# Patient Record
Sex: FEMALE | Race: WHITE | NOT HISPANIC OR LATINO | URBAN - METROPOLITAN AREA
[De-identification: names, ages, dates, MRNs, and addresses within clinical notes are randomized per-mention and may not be internally consistent; named-entity substitution may affect disease eponyms.]

---

## 2017-07-06 ENCOUNTER — APPOINTMENT (OUTPATIENT)
Dept: URBAN - METROPOLITAN AREA CLINIC 200 | Age: 42
Setting detail: DERMATOLOGY
End: 2017-07-07

## 2017-07-06 DIAGNOSIS — L64.8 OTHER ANDROGENIC ALOPECIA: ICD-10-CM

## 2017-07-06 PROBLEM — D23.5 OTHER BENIGN NEOPLASM OF SKIN OF TRUNK: Status: ACTIVE | Noted: 2017-07-06

## 2017-07-06 PROCEDURE — 99203 OFFICE O/P NEW LOW 30 MIN: CPT

## 2017-07-06 PROCEDURE — OTHER PRESCRIPTION MEDICATION MANAGEMENT: OTHER

## 2017-07-06 PROCEDURE — OTHER ORDER TESTS: OTHER

## 2017-07-06 PROCEDURE — OTHER COUNSELING: OTHER

## 2017-07-06 ASSESSMENT — LOCATION ZONE DERM: LOCATION ZONE: FACE

## 2017-07-06 ASSESSMENT — LOCATION SIMPLE DESCRIPTION DERM: LOCATION SIMPLE: SUPERIOR FOREHEAD

## 2017-07-06 ASSESSMENT — LOCATION DETAILED DESCRIPTION DERM: LOCATION DETAILED: SUPERIOR MID FOREHEAD

## 2018-07-16 ENCOUNTER — TELEPHONE (OUTPATIENT)
Dept: SCHEDULING | Facility: CLINIC | Age: 43
End: 2018-07-16

## 2018-07-20 DIAGNOSIS — E55.9 VITAMIN D DEFICIENCY: Primary | ICD-10-CM

## 2018-07-20 DIAGNOSIS — E78.00 PURE HYPERCHOLESTEROLEMIA: ICD-10-CM

## 2018-08-20 ENCOUNTER — TELEPHONE (OUTPATIENT)
Dept: SCHEDULING | Facility: CLINIC | Age: 43
End: 2018-08-20

## 2018-10-10 ENCOUNTER — APPOINTMENT (OUTPATIENT)
Dept: URBAN - METROPOLITAN AREA CLINIC 200 | Age: 43
Setting detail: DERMATOLOGY
End: 2018-10-15

## 2018-10-10 DIAGNOSIS — B07.8 OTHER VIRAL WARTS: ICD-10-CM

## 2018-10-10 DIAGNOSIS — L57.8 OTHER SKIN CHANGES DUE TO CHRONIC EXPOSURE TO NONIONIZING RADIATION: ICD-10-CM

## 2018-10-10 PROCEDURE — OTHER MIPS QUALITY: OTHER

## 2018-10-10 PROCEDURE — OTHER COUNSELING: OTHER

## 2018-10-10 PROCEDURE — 11305 SHAVE SKIN LESION 0.5 CM/<: CPT

## 2018-10-10 PROCEDURE — 99213 OFFICE O/P EST LOW 20 MIN: CPT | Mod: 25

## 2018-10-10 PROCEDURE — OTHER SHAVE REMOVAL: OTHER

## 2018-10-10 ASSESSMENT — LOCATION DETAILED DESCRIPTION DERM
LOCATION DETAILED: LEFT MEDIAL SUPERIOR CHEST
LOCATION DETAILED: LEFT DISTAL RADIAL THUMB

## 2018-10-10 ASSESSMENT — LOCATION SIMPLE DESCRIPTION DERM
LOCATION SIMPLE: CHEST
LOCATION SIMPLE: LEFT THUMB

## 2018-10-10 ASSESSMENT — LOCATION ZONE DERM
LOCATION ZONE: FINGER
LOCATION ZONE: TRUNK

## 2018-10-10 NOTE — PROCEDURE: MIPS QUALITY
Quality 110: Preventive Care And Screening: Influenza Immunization: Influenza Immunization not Administered for Documented Reasons.
Additional Notes: Patient has not received flu shot, but plans to.
Detail Level: Detailed

## 2018-10-10 NOTE — PROCEDURE: SHAVE REMOVAL
X Size Of Lesion In Cm (Optional): 0
Detail Level: Detailed
Biopsy Method: Personna blade
Bill For Surgical Tray: no
Medical Necessity Clause: This procedure was medically necessary because the lesion that was treated was:
Anesthesia Volume In Cc: 0.5
Billing Type: Third-Party Bill
Path Notes (To The Dermatopathologist): Please check margins.
Notification Instructions: Patient will be notified of biopsy results. However, patient instructed to call the office if not contacted within 2 weeks.
Medical Necessity Information: It is in your best interest to select a reason for this procedure from the list below. All of these items fulfill various CMS LCD requirements except the new and changing color options.
Hemostasis: Pressure
Post-Care Instructions: I reviewed with the patient in detail post-care instructions. Patient is to keep the biopsy site dry overnight, and then apply bacitracin twice daily until healed. Patient may apply hydrogen peroxide soaks to remove any crusting.
Was A Bandage Applied: Yes
Wound Care: Aquaphor
Consent was obtained from the patient. The risks and benefits to therapy were discussed in detail. Specifically, the risks of infection, scarring, bleeding, prolonged wound healing, incomplete removal, allergy to anesthesia, nerve injury and recurrence were addressed. Prior to the procedure, the treatment site was clearly identified and confirmed by the patient. All components of Universal Protocol/PAUSE Rule completed.

## 2019-10-31 ENCOUNTER — OFFICE VISIT (OUTPATIENT)
Dept: SURGERY | Facility: CLINIC | Age: 44
End: 2019-10-31
Payer: COMMERCIAL

## 2019-10-31 ENCOUNTER — TELEPHONE (OUTPATIENT)
Dept: SURGERY | Facility: CLINIC | Age: 44
End: 2019-10-31

## 2019-10-31 VITALS — WEIGHT: 180 LBS | BODY MASS INDEX: 33.99 KG/M2 | HEIGHT: 61 IN

## 2019-10-31 DIAGNOSIS — L08.9 INFECTED SEBACEOUS CYST OF SKIN: Primary | ICD-10-CM

## 2019-10-31 DIAGNOSIS — L72.3 INFECTED SEBACEOUS CYST OF SKIN: Primary | ICD-10-CM

## 2019-10-31 PROCEDURE — 10060 I&D ABSCESS SIMPLE/SINGLE: CPT | Performed by: SURGERY

## 2019-10-31 PROCEDURE — 87070 CULTURE OTHR SPECIMN AEROBIC: CPT | Performed by: SURGERY

## 2019-10-31 PROCEDURE — 200200 PR NO CHARGE: Performed by: SURGERY

## 2019-10-31 RX ORDER — CEPHALEXIN 500 MG/1
500 CAPSULE ORAL 4 TIMES DAILY
Qty: 40 CAPSULE | Refills: 0 | Status: SHIPPED | OUTPATIENT
Start: 2019-10-31 | End: 2019-11-10

## 2019-10-31 SDOH — HEALTH STABILITY: MENTAL HEALTH: HOW OFTEN DO YOU HAVE A DRINK CONTAINING ALCOHOL?: NEVER

## 2019-10-31 ASSESSMENT — ENCOUNTER SYMPTOMS
ACTIVITY CHANGE: 1
DIAPHORESIS: 0
BRUISES/BLEEDS EASILY: 0
CHILLS: 0
FEVER: 0
COLOR CHANGE: 1

## 2019-10-31 NOTE — PROGRESS NOTES
General Surgery H&P  Patient: Brandie Paz  MRN: [id number]  1975    Visit Date: 10/31/2019  Encounter Provider: Blue Lind  Referring Provider: Nereida Pena DO    Subjective   Consult and Breast Check (Tong CYst )      Brandie Paz is a 44 y.o. female who was seen in consultation at the request of referring physician for management recommendations.  Brandie presents to the office with a one-week history of an infected sebaceous cyst in the right lower chest just under the right inframammary fold.  Brandie had an infected cyst in this area I indeed.  She was to return to the office to have a small nodule in this area, a chronic sebaceous cyst, excised but did not do so.  Brandie has had no fever or chills and no drainage from the skin.  She has no arthralgias or myalgias.  She does have erythema surrounding the infected cyst.  She is not on antibiotics.  Brandie does not have a history of MRSA.    Medical History: History reviewed. No pertinent past medical history.    Surgical History:   Past Surgical History:   Procedure Laterality Date   • CHOLECYSTECTOMY  2002       Social History:   Social History     Social History Narrative   • Not on file       Family History:   Family History   Problem Relation Age of Onset   • Rectal cancer Biological Father    • Heart disease Maternal Grandmother    • Dementia Maternal Grandfather    • Heart disease Paternal Grandmother    • Heart disease Paternal Grandfather        Allergies: Patient has no known allergies.    Current Medications:  •  cephalexin    Review of Systems  Review of Systems   Constitutional: Positive for activity change. Negative for chills, diaphoresis and fever.   HENT: Negative for nosebleeds.    Cardiovascular: Positive for chest pain.   Skin: Positive for color change. Negative for rash.   Hematological: Does not bruise/bleed easily.       Objective     Physicial Exam  Physical Exam   Constitutional: She is oriented to person, place, and time. She  appears well-developed and well-nourished. She appears distressed.   Mild distress with pain from the infected sebaceous cyst   Pulmonary/Chest: Effort normal. No respiratory distress.   Neurological: She is alert and oriented to person, place, and time.   Skin: Skin is warm and dry. She is not diaphoretic. There is erythema.   There is an infected sebaceous cyst of the right lower chest wall just inferior to the right inframammary fold.  There is surrounding erythema for about 2 inches.  Under sterile conditions I did incise and drain this cyst under local anesthesia, and I did obtain a culture for the laboratory.   Nursing note and vitals reviewed.        Labs  No results found for: WBC, HGB, HCT, MCV, PLT    No results found for: GLUCOSE, CALCIUM, NA, K, CO2, CL, BUN, CREATININE    No results found for: ALT, AST, GGT, ALKPHOS, BILITOT      Imaging  Not applicable    Assessment       Infected sebaceous cyst          Plan   I have placed Brandie on Keflex 500 mg 4 times daily for 10 days, as well as warm soaks 3-4 times daily, 30 minutes each.  Brandie will call me if there is worsening of the infected cyst, or it has not resolved 2 weeks from now.  She will call me sooner if there are any problems.  Brandie will return to the office and have the cyst excised when it is no longer infected.      Blue Lind MD

## 2019-10-31 NOTE — LETTER
October 31, 2019     Nereida Pena DO  [address]  [address]  [facility] [address]    Patient: Brandie Paz  YOB: 1975  Date of Visit: 10/31/2019      Dear Dr. Pena:    Thank you for referring Brandie Paz to me for evaluation. Below are my notes for this consultation.    If you have questions, please do not hesitate to call me. I look forward to following your patient along with you.         Sincerely,        Blue Lind MD        CC: No Recipients  Blue Lind MD  10/31/2019  2:34 PM  Sign at close encounter  General Surgery H&P  Patient: Brandie Paz  MRN: [id number]  1975    Visit Date: 10/31/2019  Encounter Provider: Blue Lind  Referring Provider: Nereida Pena DO    Subjective   Consult and Breast Check (Tong CYst )      Brandie Paz is a 44 y.o. female who was seen in consultation at the request of referring physician for management recommendations.  Brandie presents to the office with a one-week history of an infected sebaceous cyst in the right lower chest just under the right inframammary fold.  Brandie had an infected cyst in this area I indeed.  She was to return to the office to have a small nodule in this area, a chronic sebaceous cyst, excised but did not do so.  Brandie has had no fever or chills and no drainage from the skin.  She has no arthralgias or myalgias.  She does have erythema surrounding the infected cyst.  She is not on antibiotics.  Brandie does not have a history of MRSA.    Medical History: History reviewed. No pertinent past medical history.    Surgical History:   Past Surgical History:   Procedure Laterality Date   • CHOLECYSTECTOMY  2002       Social History:   Social History     Social History Narrative   • Not on file       Family History:   Family History   Problem Relation Age of Onset   • Rectal cancer Biological Father    • Heart disease Maternal Grandmother    • Dementia Maternal Grandfather    • Heart disease Paternal Grandmother    • Heart  disease Paternal Grandfather        Allergies: Patient has no known allergies.    Current Medications:  •  cephalexin    Review of Systems  Review of Systems   Constitutional: Positive for activity change. Negative for chills, diaphoresis and fever.   HENT: Negative for nosebleeds.    Cardiovascular: Positive for chest pain.   Skin: Positive for color change. Negative for rash.   Hematological: Does not bruise/bleed easily.       Objective     Physicial Exam  Physical Exam   Constitutional: She is oriented to person, place, and time. She appears well-developed and well-nourished. She appears distressed.   Mild distress with pain from the infected sebaceous cyst   Pulmonary/Chest: Effort normal. No respiratory distress.   Neurological: She is alert and oriented to person, place, and time.   Skin: Skin is warm and dry. She is not diaphoretic. There is erythema.   There is an infected sebaceous cyst of the right lower chest wall just inferior to the right inframammary fold.  There is surrounding erythema for about 2 inches.  Under sterile conditions I did incise and drain this cyst under local anesthesia, and I did obtain a culture for the laboratory.   Nursing note and vitals reviewed.        Labs  No results found for: WBC, HGB, HCT, MCV, PLT    No results found for: GLUCOSE, CALCIUM, NA, K, CO2, CL, BUN, CREATININE    No results found for: ALT, AST, GGT, ALKPHOS, BILITOT      Imaging  Not applicable    Assessment       Infected sebaceous cyst          Plan   I have placed Brandie on Keflex 500 mg 4 times daily for 10 days, as well as warm soaks 3-4 times daily, 30 minutes each.  Brandie will call me if there is worsening of the infected cyst, or it has not resolved 2 weeks from now.  She will call me sooner if there are any problems.  Brandie will return to the office and have the cyst excised when it is no longer infected.      Blue Lind MD

## 2019-11-02 LAB
GRAM STN SPEC: NORMAL
GRAM STN SPEC: NORMAL
MICROORGANISM SPEC CULT: NORMAL

## 2020-03-10 ENCOUNTER — TRANSCRIBE ORDERS (OUTPATIENT)
Dept: SCHEDULING | Age: 45
End: 2020-03-10

## 2020-03-10 DIAGNOSIS — R92.8 OTHER ABNORMAL AND INCONCLUSIVE FINDINGS ON DIAGNOSTIC IMAGING OF BREAST: Primary | ICD-10-CM

## 2022-03-11 ENCOUNTER — TELEPHONE (OUTPATIENT)
Dept: SCHEDULING | Facility: CLINIC | Age: 47
End: 2022-03-11
Payer: COMMERCIAL

## 2022-03-11 NOTE — TELEPHONE ENCOUNTER
New Patient Appointment Request    Name of caller: Brandie Paz    Reason for Visit: Family history of heart disease    Insurance: [facility] [employer]    Insurance ID #: [id number]    Recent Procedures: None     Referred by: self    Previous Cardiologist name and phone number: Dr. George in 2018    Best contact number: [phone]    Additional notes: Prefer [facility] location but will be seen at [facility]

## 2022-06-23 NOTE — TELEPHONE ENCOUNTER
Pt calling to receive an update in regards to scheduling an earlier appt.    Pt can be reached at [phone]

## 2022-08-01 ENCOUNTER — OFFICE VISIT (OUTPATIENT)
Dept: CARDIOLOGY | Facility: CLINIC | Age: 47
End: 2022-08-01
Payer: COMMERCIAL

## 2022-08-01 VITALS — BODY MASS INDEX: 37 KG/M2 | HEIGHT: 61 IN | WEIGHT: 196 LBS | OXYGEN SATURATION: 99 % | HEART RATE: 96 BPM

## 2022-08-01 DIAGNOSIS — E78.2 ELEVATED CHOLESTEROL WITH ELEVATED TRIGLYCERIDES: ICD-10-CM

## 2022-08-01 DIAGNOSIS — Z82.49 FAMILY HISTORY OF CARDIOVASCULAR DISEASE: Primary | ICD-10-CM

## 2022-08-01 DIAGNOSIS — G43.809 OTHER MIGRAINE WITHOUT STATUS MIGRAINOSUS, NOT INTRACTABLE: ICD-10-CM

## 2022-08-01 DIAGNOSIS — Z82.49 FAMILY HISTORY OF EARLY CAD: ICD-10-CM

## 2022-08-01 PROBLEM — G43.909 MIGRAINE WITHOUT STATUS MIGRAINOSUS, NOT INTRACTABLE: Status: ACTIVE | Noted: 2022-08-01

## 2022-08-01 PROCEDURE — 3008F BODY MASS INDEX DOCD: CPT | Performed by: INTERNAL MEDICINE

## 2022-08-01 PROCEDURE — 93000 ELECTROCARDIOGRAM COMPLETE: CPT | Performed by: INTERNAL MEDICINE

## 2022-08-01 PROCEDURE — 99214 OFFICE O/P EST MOD 30 MIN: CPT | Performed by: INTERNAL MEDICINE

## 2022-08-01 RX ORDER — SUMATRIPTAN SUCCINATE 50 MG/1
50 TABLET ORAL
COMMUNITY
Start: 2022-03-03 | End: 2025-02-04

## 2022-08-01 ASSESSMENT — ENCOUNTER SYMPTOMS
GASTROINTESTINAL NEGATIVE: 1
ENDOCRINE NEGATIVE: 1
EYES NEGATIVE: 1
HEADACHES: 1
MUSCULOSKELETAL NEGATIVE: 1
RESPIRATORY NEGATIVE: 1
PSYCHIATRIC NEGATIVE: 1
ALLERGIC/IMMUNOLOGIC NEGATIVE: 1
CONSTITUTIONAL NEGATIVE: 1
CARDIOVASCULAR NEGATIVE: 1
HEMATOLOGIC/LYMPHATIC NEGATIVE: 1

## 2022-08-01 NOTE — PROGRESS NOTES
Cardiology Consult Note  HPI:dyslipidemia     Brandie Paz is a 47 y.o. female who presents with family hisotry of cad  Last seen in 2018  When seen in 2018 elevated TG  Some dyspnea with steps  Some heart burn  Dad had mi at 40.  She denies chest pain      Chief Complaint   Patient presents with   • Consult       Medical History: migraines  Dyslipidemia    Surgical History:   Past Surgical History:   Procedure Laterality Date   • CHOLECYSTECTOMY  2002       Social History:   Social History        Three children  Works in media  Mother lives locally  No tobacco  No significant alcohol   Kids one 22 daughter  One 18  One 16 daughter  Now not working               Family History:   Family History   Problem Relation Age of Onset   • Rectal cancer Biological Father    • Heart disease Maternal Grandmother    • Dementia Maternal Grandfather    • Heart disease Paternal Grandmother    • Heart disease Paternal Grandfather      Mom had stroke  Dad had colon cancer at young age , cad in his 40s  Cousin heart disease in his 40s  uncles heart disease young  Gm breast cancer  gradnfathe dementia      Allergies: Patient has no known allergies.    No current outpatient medications on file.     No current facility-administered medications for this visit.       Review of Systems   Constitutional: Negative.    HENT: Negative.    Eyes: Negative.    Respiratory: Negative.    Cardiovascular: Negative.    Gastrointestinal: Negative.    Endocrine: Negative.    Genitourinary: Negative.    Musculoskeletal: Negative.    Allergic/Immunologic: Negative.    Neurological: Positive for headaches.   Hematological: Negative.    Psychiatric/Behavioral: Negative.        Objective   Labs   No results found for: WBC, HGB, HCT, PLT, CHOL, TRIG, HDL, LDLDIRECT, ALT, AST, NA, K, CL, CREATININE, BUN, CO2, TSH, INR, HGBA1C, MICROALBUR    I    ECG   sinus rhythm      Physical Exam  Constitutional:       Appearance: Normal appearance. She is normal  weight.   HENT:      Head: Normocephalic and atraumatic.      Right Ear: External ear normal.      Left Ear: External ear normal.      Nose:      Comments: mask     Mouth/Throat:      Comments: mask  Eyes:      General: No scleral icterus.        Right eye: No discharge.         Left eye: No discharge.   Cardiovascular:      Rate and Rhythm: Normal rate and regular rhythm.   Pulmonary:      Effort: Pulmonary effort is normal.      Breath sounds: Normal breath sounds.   Abdominal:      Palpations: Abdomen is soft.   Musculoskeletal:         General: No swelling.      Cervical back: Neck supple.   Skin:     General: Skin is warm and dry.   Neurological:      General: No focal deficit present.      Mental Status: She is alert.   Psychiatric:         Mood and Affect: Mood normal.         Behavior: Behavior normal.         Thought Content: Thought content normal.         Judgment: Judgment normal.           Weight 196  Pulse 96  Pulse ox 99 percent  120/84 similar in both arms     Assessment   47 y.o. female being consulted for management recommendations       Plan       Family history of cad  Early cad for her dad  Elevated tg  Check coronary calcium   Work on diet and exercise    Follow up sleep test    Elevated tg  Has been ongoing  Work on diet and exercise  Check calcium score      Headaches  Check carotids      Mandie George MD  8/1/2022

## 2022-08-15 ENCOUNTER — HOSPITAL ENCOUNTER (OUTPATIENT)
Dept: RADIOLOGY | Age: 47
Discharge: HOME | End: 2022-08-15
Attending: INTERNAL MEDICINE

## 2022-08-15 ENCOUNTER — HOSPITAL ENCOUNTER (OUTPATIENT)
Dept: RADIOLOGY | Age: 47
End: 2022-08-15
Attending: INTERNAL MEDICINE

## 2022-08-15 DIAGNOSIS — Z82.49 FAMILY HISTORY OF CARDIOVASCULAR DISEASE: ICD-10-CM

## 2022-08-15 PROCEDURE — G1004 CDSM NDSC: HCPCS

## 2022-08-19 ENCOUNTER — HOSPITAL ENCOUNTER (OUTPATIENT)
Dept: CARDIOLOGY | Facility: CLINIC | Age: 47
Discharge: HOME | End: 2022-08-19
Attending: INTERNAL MEDICINE
Payer: COMMERCIAL

## 2022-08-19 PROCEDURE — 93880 EXTRACRANIAL BILAT STUDY: CPT | Mod: 26 | Performed by: INTERNAL MEDICINE

## 2022-08-19 PROCEDURE — 93880 EXTRACRANIAL BILAT STUDY: CPT

## 2022-08-25 ENCOUNTER — TRANSCRIBE ORDERS (OUTPATIENT)
Dept: CARDIOLOGY | Facility: HOSPITAL | Age: 47
End: 2022-08-25
Payer: COMMERCIAL

## 2022-08-25 DIAGNOSIS — Z82.49 FAMILY HISTORY OF CARDIOVASCULAR DISEASE: Primary | ICD-10-CM

## 2022-08-25 RX ORDER — ROSUVASTATIN CALCIUM 5 MG/1
5 TABLET, COATED ORAL DAILY
Qty: 90 TABLET | Refills: 1 | Status: SHIPPED | OUTPATIENT
Start: 2022-08-25 | End: 2024-05-02 | Stop reason: ALTCHOICE

## 2022-08-26 ENCOUNTER — TELEPHONE (OUTPATIENT)
Dept: CARDIOLOGY | Facility: CLINIC | Age: 47
End: 2022-08-26
Payer: COMMERCIAL

## 2023-06-27 ENCOUNTER — TELEPHONE (OUTPATIENT)
Dept: CARDIOLOGY | Facility: CLINIC | Age: 48
End: 2023-06-27
Payer: COMMERCIAL

## 2023-06-27 NOTE — TELEPHONE ENCOUNTER
Attempt to contact to let patient know to start statin and fasting labs in 3 months after starting.  Mailbox is full.  Unable to leave a message.  Will try to call again tomorrow.

## 2023-06-30 ENCOUNTER — TELEPHONE (OUTPATIENT)
Dept: CARDIOLOGY | Facility: CLINIC | Age: 48
End: 2023-06-30
Payer: COMMERCIAL

## 2023-06-30 NOTE — TELEPHONE ENCOUNTER
VM message left.  Informed start Rosuvastatin and labs in 3 months.  Asked patient to call if needs anything.

## 2024-02-27 ENCOUNTER — TELEPHONE (OUTPATIENT)
Dept: CARDIOLOGY | Facility: CLINIC | Age: 49
End: 2024-02-27
Payer: COMMERCIAL

## 2024-04-30 PROBLEM — E78.5 HLD (HYPERLIPIDEMIA): Status: ACTIVE | Noted: 2022-08-01

## 2024-04-30 PROBLEM — E55.9 VITAMIN D DEFICIENCY: Status: ACTIVE | Noted: 2024-04-30

## 2024-04-30 PROBLEM — L08.9 INFECTED SEBACEOUS CYST OF SKIN: Status: RESOLVED | Noted: 2019-10-31 | Resolved: 2024-04-30

## 2024-04-30 PROBLEM — L72.3 INFECTED SEBACEOUS CYST OF SKIN: Status: RESOLVED | Noted: 2019-10-31 | Resolved: 2024-04-30

## 2024-04-30 NOTE — PROGRESS NOTES
"     [facility] Internal Medicine at [facility]  History & Physical     Some or all of the below documentation was generated using voice recognition/dictation software. Please note this dictation software can have anomalies in its ability to transcribe. Please excuse errors which may have occurred due to this, and do not hesitate to contact me directly for anything that seems abnormal for clarification.     The following have been reviewed and updated as appropriate in this visit:    Patient ID: Brandie Paz is a 49 y.o. female Pmhx of HLD presents for establishment of care and physical    Subjective     HPI    Prior pt of Dr Pena- saw last 3/022    **This is a preventative physical exam in addition to a problem focused visit to address patient's new and/or chronic medical conditions. Patient made aware there may be an insurance copayment    Has been gaining weight. She is \"lazy\" because she has a dog that just wants to sit on her lap. She knows she needs to take care of herself better.     HLD, fam hx of early CAD: was on crestor 5mg daily. F/w Dr George but hasn't seen since 2022. Had difficult time following up with her.   CAC score in 2022 was 3    Hx of migraines: takes imitrex PRN- hasn't needed it in a few years.     Hx of abnormal LFTs: noted on office note by prior PCP in the past. U/s abdomen ordered but was never done. Has not had BW recently.     VDD: seen on BW 6/2022    Preventative:  - Sunscreen: yes  - seatbelts: yes  - Dentist: sees every 6 mo  - Eye exam: gets annual exams     Health Maintenance:   -Colon Cancer Screening: over 15 yrs ago but not since , f/w Dr Chavarria - will call to schedule   -Breast Cancer Screening: script given today  - cervical ca screening: due for exam   - tobacco: never  - alcohol: denies   - illicit Rx: denies   - occupation: not working currently, stopped working during pandemic used to work for a    - pregnancies & complications: 3 total pregnancies; 3 live births; " no complications  - menstrual periods: irregular , perimenopausal   - Feels safe at home? Lives with , feels safe  -Wellness Visit: completed today    Immunizations:  -COVID: declines   -Flu: defer  -Tdap: declines   -Herpes Zoster: wants to wait until summer   -PCV20 : not indicated             Past Medical History:   Diagnosis Date    Family history of early CAD     HLD (hyperlipidemia)     Migraines     Vitamin D deficiency        Past Surgical History:   Procedure Laterality Date    CHOLECYSTECTOMY  2002       Current Outpatient Medications   Medication Sig Dispense Refill    multivitamin-iron-folic acid  mg-mcg tablet       SUMAtriptan (IMITREX) 50 mg tablet Take 50 mg by mouth.       No current facility-administered medications for this visit.       No Known Allergies    Social History     Socioeconomic History    Marital status:      Spouse name: Not on file    Number of children: Not on file    Years of education: Not on file    Highest education level: Not on file   Occupational History    Not on file   Tobacco Use    Smoking status: Never    Smokeless tobacco: Never   Substance and Sexual Activity    Alcohol use: Not Currently    Drug use: Defer    Sexual activity: Defer   Other Topics Concern    Not on file   Social History Narrative    Not on file     Social Determinants of Health     Financial Resource Strain: Not on file   Food Insecurity: Not on file   Transportation Needs: Not on file   Physical Activity: Not on file   Stress: Not on file   Social Connections: Not on file   Intimate Partner Violence: Not on file   Housing Stability: Not on file       Family History   Problem Relation Age of Onset    Stroke Biological Mother     Hypertension Biological Mother     Heart disease Biological Father     Rectal cancer Biological Father     Heart attack Biological Father     Heart disease Maternal Grandmother     Dementia Maternal Grandfather     Heart disease Paternal Grandmother      "Heart disease Paternal Grandfather          The following have been reviewed and updated as appropriate in this visit:   Allergies  Meds  Problems         Review of Systems  Constitutional: Negative for activity change, appetite change, chills, fever and unexpected weight change. Positive for intermittent fatigue   HENT: Negative for congestion, postnasal drip, sinus pain, sore throat and trouble swallowing.    Eyes: Negative for visual disturbance.   Respiratory: Negative for cough, chest tightness, shortness of breath and wheezing.    Cardiovascular: Negative for chest pain, palpitations and leg swelling.   Gastrointestinal: Negative for abdominal pain, constipation, diarrhea and nausea.   Endocrine: Negative for polyuria.   Genitourinary: Negative for dysuria and frequency.   Musculoskeletal: Negative for back pain and myalgias.   Skin: Negative for rash.   Neurological: Negative for syncope, weakness and headaches.   Psychiatric/Behavioral: Negative for suicidal ideas.       Objective     Visit Vitals  /75   Pulse 95   Resp 16   Ht 1.549 m (5' 1\")   Wt 95.3 kg (210 lb)   SpO2 97%   BMI 39.68 kg/m²       Physical Exam  Constitutional: Appears well-developed and well-nourished. No distress.   Head: Normocephalic and atraumatic.   Eyes: EOM are normal. Pupils are equal, round, and reactive to light.   Nose: normal turbinates. Septum midline.   Ears: No cerum impaction appreciated. Pearly gray tympanic membranes b/l, with normal light reflex. External ear normal bilaterally. Canals normal bilaterally.  Neck: Normal range of motion. Neck supple.  No lymphadenopathy appreciated. No thyromegaly or thyroid nodules palpated.  Throat: uvula midline. Pharynx non erythematous, no ulcers or thrush.  Cardiovascular: Normal rate, regular rhythm and normal heart sounds.  Exam reveals no gallop and no friction rub. No murmur heard.  Pulmonary/Chest: Effort normal. No respiratory distress. CTA  b/l. No rales. "   Abdominal: Soft. Bowel sounds are normal. Exhibits no distension. There is no tenderness. There is no guarding.   Musculoskeletal: Exhibits no extremity edema. 2+ DT and PT pulses bilaterally. 5/5 strength UE and LE bl. Sensation intact. 2+ DTRs UE and LE b/l.  Neurological: Alert and oriented to person, place, and time.   Skin: Skin is warm and dry. No rash noted. Not diaphoretic.     Assessment/Plan   Problem List Items Addressed This Visit       Family history of early CAD     Predominantly in her grandparents.  Will follow-up lipid panel.  We will discuss reestablishment with cardiology pending labs.         HLD (hyperlipidemia)     No longer on statin.  Will follow-up lipid panel and discuss reinitiation if needed.         Migraines     Stable.  No recent flares.  Continue Imitrex as needed.         Vitamin D deficiency     Follow-up vitamin D level.  Not currently on supplementation.  Will follow-up level and if less than 30 will recommend supplement.         Encounter for wellness examination in adult - Primary    Relevant Orders    CBC and Differential    Comprehensive metabolic panel    Class 2 severe obesity due to excess calories with serious comorbidity and body mass index (BMI) of 39.0 to 39.9 in adult (CMS/[facility])     Discussed the following in brief today  It is my recommendation at this time that you try and change to a plant based/mediterranean diet, which includes increase in fruit, vegetables, proteins and decrease in foods high in saturated fats or processed foods.  I would also recommend including physical activity into your regimen.  Please try and get at least 150 min's/week of moderate intensity exercise(including resistance training!).  Moderate intensity is exercise where you are unable to hold a full conversation during the activity. This could include but is not limited to walking, aerobics, swimming, dancing etc.           Relevant Orders    Vitamin D 25 hydroxy    Lipid panel     Hemoglobin A1c     Other Visit Diagnoses       Perimenopausal        Relevant Orders    TSH w reflex FT4    Encounter for screening mammogram for breast cancer        Relevant Orders    BI SCREENING MAMMOGRAM BILATERAL(TOMOSYNTHESIS)            Return in about 1 year (around 5/2/2025) for annual exam, Next scheduled follow-up.       Written education and action steps suggested to the patient are documented in After Visit Summary / Patient Instructions sections of this encounter.        Sobia Carpio DO MS

## 2024-05-02 ENCOUNTER — OFFICE VISIT (OUTPATIENT)
Dept: INTERNAL MEDICINE | Facility: CLINIC | Age: 49
End: 2024-05-02
Payer: COMMERCIAL

## 2024-05-02 VITALS
WEIGHT: 210 LBS | HEIGHT: 61 IN | OXYGEN SATURATION: 97 % | HEART RATE: 95 BPM | SYSTOLIC BLOOD PRESSURE: 120 MMHG | RESPIRATION RATE: 16 BRPM | DIASTOLIC BLOOD PRESSURE: 75 MMHG | BODY MASS INDEX: 39.65 KG/M2

## 2024-05-02 DIAGNOSIS — Z12.31 ENCOUNTER FOR SCREENING MAMMOGRAM FOR BREAST CANCER: ICD-10-CM

## 2024-05-02 DIAGNOSIS — E66.812 CLASS 2 SEVERE OBESITY DUE TO EXCESS CALORIES WITH SERIOUS COMORBIDITY AND BODY MASS INDEX (BMI) OF 39.0 TO 39.9 IN ADULT (CMS/HCC): ICD-10-CM

## 2024-05-02 DIAGNOSIS — E66.01 CLASS 2 SEVERE OBESITY DUE TO EXCESS CALORIES WITH SERIOUS COMORBIDITY AND BODY MASS INDEX (BMI) OF 39.0 TO 39.9 IN ADULT (CMS/HCC): ICD-10-CM

## 2024-05-02 DIAGNOSIS — Z00.00 ENCOUNTER FOR WELLNESS EXAMINATION IN ADULT: Primary | ICD-10-CM

## 2024-05-02 DIAGNOSIS — E55.9 VITAMIN D DEFICIENCY: ICD-10-CM

## 2024-05-02 DIAGNOSIS — G43.809 OTHER MIGRAINE WITHOUT STATUS MIGRAINOSUS, NOT INTRACTABLE: ICD-10-CM

## 2024-05-02 DIAGNOSIS — N95.1 PERIMENOPAUSAL: ICD-10-CM

## 2024-05-02 DIAGNOSIS — Z82.49 FAMILY HISTORY OF EARLY CAD: ICD-10-CM

## 2024-05-02 DIAGNOSIS — E78.2 MIXED HYPERLIPIDEMIA: ICD-10-CM

## 2024-05-02 PROCEDURE — 99386 PREV VISIT NEW AGE 40-64: CPT | Performed by: STUDENT IN AN ORGANIZED HEALTH CARE EDUCATION/TRAINING PROGRAM

## 2024-05-02 PROCEDURE — 3008F BODY MASS INDEX DOCD: CPT | Performed by: STUDENT IN AN ORGANIZED HEALTH CARE EDUCATION/TRAINING PROGRAM

## 2024-05-02 ASSESSMENT — PATIENT HEALTH QUESTIONNAIRE - PHQ9: SUM OF ALL RESPONSES TO PHQ9 QUESTIONS 1 & 2: 0

## 2024-05-02 NOTE — ASSESSMENT & PLAN NOTE
Discussed the following in brief today  It is my recommendation at this time that you try and change to a plant based/mediterranean diet, which includes increase in fruit, vegetables, proteins and decrease in foods high in saturated fats or processed foods.  I would also recommend including physical activity into your regimen.  Please try and get at least 150 min's/week of moderate intensity exercise(including resistance training!).  Moderate intensity is exercise where you are unable to hold a full conversation during the activity. This could include but is not limited to walking, aerobics, swimming, dancing etc.

## 2024-05-02 NOTE — ASSESSMENT & PLAN NOTE
Follow-up vitamin D level.  Not currently on supplementation.  Will follow-up level and if less than 30 will recommend supplement.

## 2024-05-02 NOTE — ASSESSMENT & PLAN NOTE
Predominantly in her grandparents.  Will follow-up lipid panel.  We will discuss reestablishment with cardiology pending labs.

## 2024-07-24 LAB
25(OH)D3+25(OH)D2 SERPL-MCNC: 24 NG/ML (ref 30–100)
ALBUMIN SERPL-MCNC: 4.1 G/DL (ref 3.6–5.1)
ALBUMIN/GLOB SERPL: 1.5 (CALC) (ref 1–2.5)
ALP SERPL-CCNC: 79 U/L (ref 37–153)
ALT SERPL-CCNC: 26 U/L (ref 6–29)
AST SERPL-CCNC: 21 U/L (ref 10–35)
BASOPHILS # BLD AUTO: 29 CELLS/UL (ref 0–200)
BASOPHILS NFR BLD AUTO: 0.3 %
BILIRUB SERPL-MCNC: 0.5 MG/DL (ref 0.2–1.2)
BUN SERPL-MCNC: 11 MG/DL (ref 7–25)
BUN/CREAT SERPL: NORMAL (CALC) (ref 6–22)
CALCIUM SERPL-MCNC: 9.2 MG/DL (ref 8.6–10.4)
CHLORIDE SERPL-SCNC: 101 MMOL/L (ref 98–110)
CHOLEST SERPL-MCNC: 222 MG/DL
CHOLEST/HDLC SERPL: 3.4 (CALC)
CO2 SERPL-SCNC: 28 MMOL/L (ref 20–32)
CREAT SERPL-MCNC: 0.57 MG/DL (ref 0.5–1.03)
EGFRCR SERPLBLD CKD-EPI 2021: 111 ML/MIN/1.73M2
EOSINOPHIL # BLD AUTO: 365 CELLS/UL (ref 15–500)
EOSINOPHIL NFR BLD AUTO: 3.8 %
ERYTHROCYTE [DISTWIDTH] IN BLOOD BY AUTOMATED COUNT: 13.7 % (ref 11–15)
GLOBULIN SER CALC-MCNC: 2.7 G/DL (CALC) (ref 1.9–3.7)
GLUCOSE SERPL-MCNC: 92 MG/DL (ref 65–99)
HBA1C MFR BLD: 5.7 % OF TOTAL HGB
HCT VFR BLD AUTO: 42.4 % (ref 35–45)
HDLC SERPL-MCNC: 65 MG/DL
HGB BLD-MCNC: 13.5 G/DL (ref 11.7–15.5)
LDLC SERPL CALC-MCNC: 121 MG/DL (CALC)
LYMPHOCYTES # BLD AUTO: 3043 CELLS/UL (ref 850–3900)
LYMPHOCYTES NFR BLD AUTO: 31.7 %
MCH RBC QN AUTO: 27.8 PG (ref 27–33)
MCHC RBC AUTO-ENTMCNC: 31.8 G/DL (ref 32–36)
MCV RBC AUTO: 87.4 FL (ref 80–100)
MONOCYTES # BLD AUTO: 538 CELLS/UL (ref 200–950)
MONOCYTES NFR BLD AUTO: 5.6 %
NEUTROPHILS # BLD AUTO: 5626 CELLS/UL (ref 1500–7800)
NEUTROPHILS NFR BLD AUTO: 58.6 %
NONHDLC SERPL-MCNC: 157 MG/DL (CALC)
PLATELET # BLD AUTO: 278 THOUSAND/UL (ref 140–400)
PMV BLD REES-ECKER: 10.4 FL (ref 7.5–12.5)
POTASSIUM SERPL-SCNC: 4 MMOL/L (ref 3.5–5.3)
PROT SERPL-MCNC: 6.8 G/DL (ref 6.1–8.1)
RBC # BLD AUTO: 4.85 MILLION/UL (ref 3.8–5.1)
SODIUM SERPL-SCNC: 139 MMOL/L (ref 135–146)
TRIGL SERPL-MCNC: 235 MG/DL
TSH SERPL-ACNC: 3.17 MIU/L
WBC # BLD AUTO: 9.6 THOUSAND/UL (ref 3.8–10.8)

## 2025-01-23 NOTE — TELEPHONE ENCOUNTER
Lloyd pt to offer next available appt    Appt is scheduled for 02/04 @920am w/ Dr George [facility]

## 2025-02-03 PROBLEM — R93.1 AGATSTON CORONARY ARTERY CALCIUM SCORE LESS THAN 100: Status: ACTIVE | Noted: 2025-02-03

## 2025-02-03 PROBLEM — Z91.89 CARDIOVASCULAR RISK FACTOR: Status: ACTIVE | Noted: 2025-02-03

## 2025-02-03 NOTE — PROGRESS NOTES
Cardiology Note       Reason for visit:   Chief Complaint   Patient presents with    Follow-up    Hyperlipidemia      HPI   Brandie Paz is a 50 y.o. female who presents with  family hisotry of cad  Last seen in 2022  When seen in 2018 elevated TG    In 2022 she had coronary calcium score noted to be 2    Asked her to have labs and start stain but appears has not been done    She is fatigued  Snores, and having headaches      When she lays down , notes heart rate  No chest pain  No edema   No dyspnea  Headaches vary with menstrual cycle             Past Medical History:   Diagnosis Date    Family history of early CAD     HLD (hyperlipidemia)     Migraines     Vitamin D deficiency      Past Surgical History   Procedure Laterality Date    Cholecystectomy  2002     Social History        Three children    Three kids in college  Two immaculata      Mother lives locally  No tobacco  No significant alcohol     Now not working  Family History   Problem Relation Name Age of Onset    Stroke Biological Mother      Hypertension Biological Mother      Heart disease Biological Father      Rectal cancer Biological Father      Heart attack Biological Father      Heart disease Maternal Grandmother      Dementia Maternal Grandfather      Heart disease Paternal Grandmother      Heart disease Paternal Grandfather     Mom had stroke  Dad had colon cancer at young age , cad in his 40s  Cousin heart disease in his 40s  uncles heart disease young  Gm breast cancer  Gradnfather dementia  Patient has no known allergies.  Current Outpatient Medications   Medication Sig Dispense Refill    multivitamin-iron-folic acid  mg-mcg tablet        No current facility-administered medications for this visit.       Review of Systems   Constitutional: Positive for malaise/fatigue and weight loss.   HENT: Negative.     Eyes: Negative.    Cardiovascular: Negative.    Respiratory:  Positive for snoring.    Endocrine: Negative.     Hematologic/Lymphatic: Negative.    Skin: Negative.    Musculoskeletal: Negative.    Gastrointestinal: Negative.    Neurological: Negative.    Psychiatric/Behavioral: Negative.     Allergic/Immunologic: Negative.      Objective   Vitals:    02/04/25 0934   BP: (!) 142/98   Pulse: (!) 109   SpO2: 95%     Wt Readings from Last 3 Encounters:   02/04/25 97.1 kg (214 lb)   05/02/24 95.3 kg (210 lb)   08/01/22 88.9 kg (196 lb)    Bp was 140/90  Checked several times  Prior to leaving was down to 124/80  Heart rate improved to 99 on pulse ox    Body mass index is 40.43 kg/m².    Physical Exam  Constitutional:       Appearance: She is obese.   HENT:      Head: Normocephalic and atraumatic.      Right Ear: External ear normal.      Left Ear: External ear normal.   Eyes:      General: No scleral icterus.        Left eye: No discharge.   Cardiovascular:      Rate and Rhythm: Normal rate and regular rhythm.      Heart sounds: Normal heart sounds.   Pulmonary:      Breath sounds: Normal breath sounds.   Abdominal:      Palpations: Abdomen is soft.   Musculoskeletal:         General: No swelling.      Cervical back: Neck supple.   Skin:     General: Skin is warm.   Neurological:      General: No focal deficit present.      Mental Status: She is alert.   Psychiatric:         Mood and Affect: Mood normal.         Behavior: Behavior normal.         Thought Content: Thought content normal.         Judgment: Judgment normal.         Lab Results   Component Value Date    WBC 9.6 07/23/2024    HGB 13.5 07/23/2024     07/23/2024    CHOL 222 (H) 07/23/2024    TRIG 235 (H) 07/23/2024    HDL 65 07/23/2024    ALT 26 07/23/2024    AST 21 07/23/2024     07/23/2024    K 4.0 07/23/2024    CREATININE 0.57 07/23/2024    TSH 3.17 07/23/2024    HGBA1C 5.7 (H) 07/23/2024      Imaging    I have independently reviewed the pertinent imaging from the last 24 hrs.  There is mild heterogeneous plaque in the right internal carotid artery, but  "no significant stenosis.    There is mild homogeneous plaque in the left internal carotid artery, but no significant stenosis.   Tortuosity noted in both internal carotid arteries.  Antegrade vertebral flow bilaterally.  Technically difficult study in grayscale.  MPRESSION:  1. Composite Agatston coronary artery calcium score of 3, which is between the  75 and 90 percentile for subjects at the same age, gender and ethnicity as per  KASPER 2006 Database. This correlates to \"minimal identifiable plaque\" and \"very  unlikely, less than 10 %\" risk of coronary artery disease.        ECG   sinus tachycardia     Assessment   Problem List Items Addressed This Visit       Family history of early CAD    Relevant Orders    [facility] MUSE ECG 12 lead (clinic performed) (Completed)    Coronary calcium score was low  But to have any at young age is concerning  Started statin but she did not proceed due to wanting to improve things on her own  She remains not very acitive and gained  Weight discussed with her would like her to work on get healthy plus low dose statin due to already having plaque at young age  Reviewed can try low dose a few days a week of rosuvastatin and can titrate it up   Started crestor 5 mg to start once a week  Slowly titrate up follow up labs  She is already in the 75-90 percentile for women her age        HLD (hyperlipidemia) - Primary    Relevant Orders    [facility] MUSE ECG 12 lead (clinic performed) (Completed)    Discussed consider weight and wellness center  Need to get more acitve  Start low dose statin as above      Cardiovascular risk factor    Relevant Orders    [facility] MUSE ECG 12 lead (clinic performed) (Completed)    Agatston coronary artery calcium score less than 100    Relevant Orders    [facility] MUSE ECG 12 lead (clinic performed) (Completed)    See above started low dose statin due to risk      BMI 40.0-44.9, adult (CMS/[facility])    Palpitations    Agree very high likelihood of marv  She " snores  High heart rate and bp   Headaches and fatigue  Ordered home sleep test  Ordered echo  Discussed consider weigh and wellness program  Consider glp 1 if covered  May be more likely covered if odsa  High heart rate normal hgb and tsh suspect due to fitness  And deconditioning and marv  Ordered echo   She will check bp and heart rate at home and send to me  Consider low dose coreg  Suspect treating marv will improve it        Ldl 121  Tg 235  Hgb A1c 5.7    I spent  50 minutes on this date of service performing the following activities: documenting, preparing for visit, providing counseling and education, and communicating results.        Mandie George MD  2/4/2025

## 2025-02-04 ENCOUNTER — OFFICE VISIT (OUTPATIENT)
Dept: CARDIOLOGY | Facility: CLINIC | Age: 50
End: 2025-02-04
Payer: COMMERCIAL

## 2025-02-04 VITALS
HEART RATE: 109 BPM | DIASTOLIC BLOOD PRESSURE: 98 MMHG | HEIGHT: 61 IN | SYSTOLIC BLOOD PRESSURE: 142 MMHG | BODY MASS INDEX: 40.4 KG/M2 | OXYGEN SATURATION: 95 % | WEIGHT: 214 LBS

## 2025-02-04 DIAGNOSIS — R00.2 PALPITATIONS: ICD-10-CM

## 2025-02-04 DIAGNOSIS — R93.1 AGATSTON CORONARY ARTERY CALCIUM SCORE LESS THAN 100: ICD-10-CM

## 2025-02-04 DIAGNOSIS — Z82.49 FAMILY HISTORY OF EARLY CAD: ICD-10-CM

## 2025-02-04 DIAGNOSIS — E78.2 MIXED HYPERLIPIDEMIA: Primary | ICD-10-CM

## 2025-02-04 DIAGNOSIS — Z91.89 CARDIOVASCULAR RISK FACTOR: ICD-10-CM

## 2025-02-04 LAB
ATRIAL RATE: 109
P AXIS: 44
PR INTERVAL: 158
QRS DURATION: 86
QT INTERVAL: 334
QTC CALCULATION(BAZETT): 449
R AXIS: 46
T WAVE AXIS: 38
VENTRICULAR RATE: 109

## 2025-02-04 PROCEDURE — 93000 ELECTROCARDIOGRAM COMPLETE: CPT | Performed by: INTERNAL MEDICINE

## 2025-02-04 PROCEDURE — 99215 OFFICE O/P EST HI 40 MIN: CPT | Performed by: INTERNAL MEDICINE

## 2025-02-04 PROCEDURE — 3008F BODY MASS INDEX DOCD: CPT | Performed by: INTERNAL MEDICINE

## 2025-02-04 RX ORDER — ROSUVASTATIN CALCIUM 5 MG/1
5 TABLET, COATED ORAL DAILY
Qty: 30 TABLET | Refills: 1 | Status: SHIPPED | OUTPATIENT
Start: 2025-02-04 | End: 2025-08-03

## 2025-02-04 ASSESSMENT — ENCOUNTER SYMPTOMS
ENDOCRINE NEGATIVE: 1
PSYCHIATRIC NEGATIVE: 1
CARDIOVASCULAR NEGATIVE: 1
WEIGHT LOSS: 1
MUSCULOSKELETAL NEGATIVE: 1
SNORING: 1
EYES NEGATIVE: 1
ALLERGIC/IMMUNOLOGIC NEGATIVE: 1
HEMATOLOGIC/LYMPHATIC NEGATIVE: 1
GASTROINTESTINAL NEGATIVE: 1
NEUROLOGICAL NEGATIVE: 1

## 2025-06-21 LAB
ALBUMIN SERPL-MCNC: 4.3 G/DL (ref 3.6–5.1)
ALBUMIN/GLOB SERPL: 1.4 (CALC) (ref 1–2.5)
ALP SERPL-CCNC: 73 U/L (ref 37–153)
ALT SERPL-CCNC: 31 U/L (ref 6–29)
AST SERPL-CCNC: 25 U/L (ref 10–35)
BILIRUB SERPL-MCNC: 0.4 MG/DL (ref 0.2–1.2)
BUN SERPL-MCNC: 12 MG/DL (ref 7–25)
BUN/CREAT SERPL: ABNORMAL (CALC) (ref 6–22)
CALCIUM SERPL-MCNC: 9.4 MG/DL (ref 8.6–10.4)
CHLORIDE SERPL-SCNC: 102 MMOL/L (ref 98–110)
CHOLEST SERPL-MCNC: 252 MG/DL
CHOLEST/HDLC SERPL: 3.7 (CALC)
CO2 SERPL-SCNC: 26 MMOL/L (ref 20–32)
CREAT SERPL-MCNC: 0.66 MG/DL (ref 0.5–1.03)
EGFRCR SERPLBLD CKD-EPI 2021: 106 ML/MIN/1.73M2
GLOBULIN SER CALC-MCNC: 3 G/DL (CALC) (ref 1.9–3.7)
GLUCOSE SERPL-MCNC: 88 MG/DL (ref 65–99)
HDLC SERPL-MCNC: 68 MG/DL
LDLC SERPL CALC-MCNC: 146 MG/DL (CALC)
NONHDLC SERPL-MCNC: 184 MG/DL (CALC)
POTASSIUM SERPL-SCNC: 4.2 MMOL/L (ref 3.5–5.3)
PROT SERPL-MCNC: 7.3 G/DL (ref 6.1–8.1)
SODIUM SERPL-SCNC: 137 MMOL/L (ref 135–146)
TRIGL SERPL-MCNC: 249 MG/DL

## 2025-06-23 ENCOUNTER — TELEPHONE (OUTPATIENT)
Dept: CARDIOLOGY | Facility: CLINIC | Age: 50
End: 2025-06-23
Payer: COMMERCIAL

## 2025-06-23 ENCOUNTER — RESULTS FOLLOW-UP (OUTPATIENT)
Dept: CARDIOLOGY | Facility: CLINIC | Age: 50
End: 2025-06-23

## 2025-06-23 NOTE — PROGRESS NOTES
Cardiology Note       Reason for visit:   Chief Complaint   Patient presents with    Follow-up      HPI   Brandie Paz is a 50 y.o. female who presents with family hisotry of cad  Last seen in 2018  When seen in 2018 elevated TG  Dad had mi young  2022 coronary calcium score of 2    She does not feel well  She notes her heart rate at night    Palpitations at night    No chest pain , or dyspnea        Past Medical History:   Diagnosis Date    Family history of early CAD     HLD (hyperlipidemia)     Migraines     Vitamin D deficiency      Past Surgical History   Procedure Laterality Date    Cholecystectomy  2002     Social History        Three children  Works in media  Mother lives locally  No tobacco  No significant alcohol   Kids one 24 daughter  One 20  One 18 daughter  Now not working  Family History   Problem Relation Name Age of Onset    Stroke Biological Mother      Hypertension Biological Mother      Heart disease Biological Father      Rectal cancer Biological Father      Heart attack Biological Father      Heart disease Maternal Grandmother      Dementia Maternal Grandfather      Heart disease Paternal Grandmother      Heart disease Paternal Grandfather       Patient has no known allergies.  Current Outpatient Medications   Medication Sig Dispense Refill    multivitamin-iron-folic acid  mg-mcg tablet       rosuvastatin (CRESTOR) 5 mg tablet Take 1 tablet (5 mg total) by mouth daily. (Patient not taking: Reported on 6/24/2025) 30 tablet 1     No current facility-administered medications for this visit.       Review of Systems   Constitutional: Negative.   HENT: Negative.     Eyes: Negative.    Cardiovascular:  Positive for irregular heartbeat and palpitations.   Respiratory: Negative.     Endocrine: Negative.    Skin: Negative.    Musculoskeletal: Negative.    Gastrointestinal: Negative.    Neurological: Negative.    Psychiatric/Behavioral: Negative.       Objective   Vitals:    06/24/25 0924    BP: 118/81   Pulse: 90   SpO2: 98%     Wt Readings from Last 3 Encounters:   06/24/25 97.5 kg (215 lb)   06/24/25 97.8 kg (215 lb 9.6 oz)   02/04/25 97.1 kg (214 lb)      Body mass index is 40.62 kg/m².    Physical Exam  Constitutional:       Appearance: Normal appearance.   HENT:      Head: Atraumatic.      Right Ear: External ear normal.      Left Ear: External ear normal.   Eyes:      General: No scleral icterus.        Right eye: No discharge.         Left eye: No discharge.   Cardiovascular:      Rate and Rhythm: Normal rate and regular rhythm.      Heart sounds: Normal heart sounds.   Pulmonary:      Breath sounds: Normal breath sounds.   Abdominal:      Palpations: Abdomen is soft.   Musculoskeletal:         General: No swelling.      Cervical back: Neck supple.   Neurological:      General: No focal deficit present.      Mental Status: She is alert.   Psychiatric:         Mood and Affect: Mood normal.         Thought Content: Thought content normal.         Judgment: Judgment normal.         Lab Results   Component Value Date    WBC 9.6 07/23/2024    HGB 13.5 07/23/2024     07/23/2024    CHOL 252 (H) 06/20/2025    TRIG 249 (H) 06/20/2025    HDL 68 06/20/2025    ALT 31 (H) 06/20/2025    AST 25 06/20/2025     06/20/2025    K 4.2 06/20/2025    CREATININE 0.66 06/20/2025    TSH 3.17 07/23/2024    HGBA1C 5.7 (H) 07/23/2024      Imaging    I have independently reviewed the pertinent imaging from the last 24 hrs.    ECG   sinus rhythm     Assessment   Problem List Items Addressed This Visit       Family history of early CAD    Calcium score of 3    Reviewed with her to start low dose statin  She is worried about side effects  She will try once a week  And titrate up dose        HLD (hyperlipidemia) - Primary    See above   Try statin      Cardiovascular risk factor    Agatston coronary artery calcium score less than 100    BMI 40.0-44.9, adult (CMS/[facility])    Discussed with plan to try an exercise and  "dietary issues      Palpitations    Mostly at night  Check sleep test          --  IMPRESSION:  1. Composite Agatston coronary artery calcium score of 3, which is between the  75 and 90 percentile for subjects at the same age, gender and ethnicity as per  KASPER 2006 Database. This correlates to \"minimal identifiable plaque\" and \"very  unlikely, less than 10 %\" risk of coronary artery disease.    Started statin ldl increased to 146         Mandie George MD  6/24/2025                   "

## 2025-06-23 NOTE — TELEPHONE ENCOUNTER
Lvm for pt letting her know her most recent labs show her cholesterol is higher per Dr George.  She would like her to increase her rosuvastatin to 3-4 x a week if she is taking it (when it was once a week).  Advised she has appt tomorrow at [facility] location and above can be discussed at that time.

## 2025-06-24 ENCOUNTER — OFFICE VISIT (OUTPATIENT)
Dept: CARDIOLOGY | Facility: CLINIC | Age: 50
End: 2025-06-24
Payer: COMMERCIAL

## 2025-06-24 ENCOUNTER — HOSPITAL ENCOUNTER (OUTPATIENT)
Dept: CARDIOLOGY | Facility: CLINIC | Age: 50
Discharge: HOME | End: 2025-06-24
Attending: INTERNAL MEDICINE
Payer: COMMERCIAL

## 2025-06-24 VITALS
HEIGHT: 61 IN | DIASTOLIC BLOOD PRESSURE: 82 MMHG | BODY MASS INDEX: 40.7 KG/M2 | WEIGHT: 215.6 LBS | SYSTOLIC BLOOD PRESSURE: 145 MMHG

## 2025-06-24 VITALS
OXYGEN SATURATION: 98 % | SYSTOLIC BLOOD PRESSURE: 118 MMHG | DIASTOLIC BLOOD PRESSURE: 81 MMHG | BODY MASS INDEX: 40.59 KG/M2 | HEIGHT: 61 IN | WEIGHT: 215 LBS | HEART RATE: 90 BPM

## 2025-06-24 DIAGNOSIS — Z82.49 FAMILY HISTORY OF EARLY CAD: ICD-10-CM

## 2025-06-24 DIAGNOSIS — R93.1 AGATSTON CORONARY ARTERY CALCIUM SCORE LESS THAN 100: ICD-10-CM

## 2025-06-24 DIAGNOSIS — Z91.89 CARDIOVASCULAR RISK FACTOR: ICD-10-CM

## 2025-06-24 DIAGNOSIS — E78.2 MIXED HYPERLIPIDEMIA: Primary | ICD-10-CM

## 2025-06-24 DIAGNOSIS — R00.2 PALPITATIONS: ICD-10-CM

## 2025-06-24 DIAGNOSIS — E78.2 MIXED HYPERLIPIDEMIA: ICD-10-CM

## 2025-06-24 LAB
AORTIC ROOT ANNULUS: 3.2 CM
AORTIC VALVE MEAN VELOCITY: 0.98 M/S
AORTIC VALVE VELOCITY TIME INTEGRAL: 26.6 CM
ASCENDING AORTA: 3.5 CM
ATRIAL RATE: 90
AV MEAN GRADIENT: 4 MMHG
AV PEAK GRADIENT: 7 MMHG
AV PEAK VELOCITY-S: 1.3 M/S
AV VALVE AREA INDEX: 0.82
AV VALVE AREA: 1.35 CM2
AV VELOCITY RATIO: 0.66
AVA (VTI): 1.68 CM2
BSA FOR ECHO PROCEDURE: 2.05 M2
CUSP SEPARATION: 1.2 CM
DOP CALC LVOT STROKE VOLUME: 44.76 CM3
DOP CALC RVOT AREA: 4.15 CM2
DOP CALC RVOT STROKE VOLUME: 45.68 CM3
E WAVE DECELERATION TIME: 204 MS
E/A RATIO: 0.8
E/E' RATIO: 9.6
E/LAT E' RATIO: 6.9
EDV (BP): 71.2 CM3
EF (A4C): 64.2 %
EF A2C: 68.4 %
EJECTION FRACTION: 65.9 %
ESV (BP): 24.3 CM3
FRACTIONAL SHORTENING: 38.76 %
INTERVENTRICULAR SEPTUM: 0.94 CM
LA ESV (BP): 36.1 CM3
LA ESV INDEX (A2C): 15.76 CM3/M2
LA ESV INDEX (BP): 17.61 CM3/M2
LA/AORTA RATIO: 0.97
LAAS-AP2: 13.7 CM2
LAAS-AP4: 14.2 CM2
LAD 2D: 3.1 CM
LAL MED-LAT (A4C): 4.34 CM
LAV-S: 32.3 CM3
LEFT ATRIAL LENGTH SUPERIOR-INFERIOR (APICAL 2-CHAMBER VIEW): 4.76 CM
LEFT ATRIUM VOLUME INDEX: 18.05 CM3/M2
LEFT ATRIUM VOLUME: 37 CM3
LEFT INTERNAL DIMENSION IN SYSTOLE: 2.37 CM (ref 2.78–4.21)
LEFT VENTRICLE DIASTOLIC VOLUME INDEX: 36.83 CM3/M2
LEFT VENTRICLE DIASTOLIC VOLUME: 75.5 CM3
LEFT VENTRICLE SYSTOLIC VOLUME INDEX: 13.22 CM3/M2
LEFT VENTRICLE SYSTOLIC VOLUME: 27.1 CM3
LEFT VENTRICULAR INTERNAL DIMENSION IN DIASTOLE: 3.87 CM (ref 4.72–6.55)
LEFT VENTRICULAR POSTERIOR WALL IN END DIASTOLE: 0.88 CM (ref 0.61–1.15)
LV DIASTOLIC VOLUME: 63.8 CM3
LV ESV (APICAL 2 CHAMBER): 20.1 CM3
LVAD-AP2: 23.6 CM2
LVAD-AP4: 25.4 CM2
LVAS-AP2: 11.3 CM2
LVAS-AP4: 13.9 CM2
LVEDVI(A2C): 31.12 CM3/M2
LVEDVI(BP): 34.73 CM3/M2
LVESVI(A2C): 9.8 CM3/M2
LVESVI(BP): 11.85 CM3/M2
LVLD-AP2: 7.41 CM
LVLD-AP4: 6.98 CM
LVLS-AP2: 5.59 CM
LVLS-AP4: 6.11 CM
LVOT 2D: 1.8 CM
LVOT A: 2.54 CM2
LVOT MG: 2 MMHG
LVOT MV: 0.61 M/S
LVOT PEAK VELOCITY: 0.88 M/S
LVOT PG: 3 MMHG
LVOT STROKE VOLUME INDEX: 21.84 ML/M2
LVOT VTI: 17.6 CM
MLH CV ECHO AVA INDEX VELOCITY RATIO: 0.7
MV E'TISSUE VEL-LAT: 0.09 M/S
MV E'TISSUE VEL-MED: 0.06 M/S
MV PEAK A VEL: 0.76 M/S
MV PEAK E VEL: 0.59 M/S
P AXIS: 40
PISA MRMAX VEL: 4.27 M/S
POSTERIOR WALL: 0.88 CM
PR INTERVAL: 162
PULMONARY REGURGITATION LATE DIASTOLIC VELOCITY: 0.74 M/S
PV MEAN GRADIENT: 1 MMHG
PV MEAN VELOCITY: 0.54 M/S
PV PEAK GRADIENT: 3 MMHG
PV PV: 0.9 M/S
PV VALVE AREA: 2.44 CM2
QRS DURATION: 92
QT INTERVAL: 360
QTC CALCULATION(BAZETT): 440
R AXIS: 41
RAP: 8 MMHG
RVOT D: 2.3 CM
RVOT VMAX: 0.53 M/S
RVOT VTI: 11 CM
SEPTAL TISSUE DOPPLER FREE WALL LATE DIA VELOCITY (APICAL 4 CHAMBER VIEW): 0.15 M/S
T WAVE AXIS: 37
VENTRICULAR RATE: 90
Z-SCORE OF LEFT VENTRICULAR DIMENSION IN END DIASTOLE: -3.62
Z-SCORE OF LEFT VENTRICULAR DIMENSION IN END SYSTOLE: -2.9
Z-SCORE OF LEFT VENTRICULAR POSTERIOR WALL IN END DIASTOLE: 0.26

## 2025-06-24 PROCEDURE — 93306 TTE W/DOPPLER COMPLETE: CPT | Performed by: INTERNAL MEDICINE

## 2025-06-24 PROCEDURE — 93000 ELECTROCARDIOGRAM COMPLETE: CPT | Performed by: INTERNAL MEDICINE

## 2025-06-24 PROCEDURE — 3008F BODY MASS INDEX DOCD: CPT | Performed by: INTERNAL MEDICINE

## 2025-06-24 PROCEDURE — 99214 OFFICE O/P EST MOD 30 MIN: CPT | Performed by: INTERNAL MEDICINE

## 2025-06-24 ASSESSMENT — ENCOUNTER SYMPTOMS
PALPITATIONS: 1
NEUROLOGICAL NEGATIVE: 1
ENDOCRINE NEGATIVE: 1
EYES NEGATIVE: 1
RESPIRATORY NEGATIVE: 1
CONSTITUTIONAL NEGATIVE: 1
IRREGULAR HEARTBEAT: 1
PSYCHIATRIC NEGATIVE: 1
GASTROINTESTINAL NEGATIVE: 1
MUSCULOSKELETAL NEGATIVE: 1